# Patient Record
Sex: FEMALE | Race: BLACK OR AFRICAN AMERICAN | NOT HISPANIC OR LATINO | URBAN - METROPOLITAN AREA
[De-identification: names, ages, dates, MRNs, and addresses within clinical notes are randomized per-mention and may not be internally consistent; named-entity substitution may affect disease eponyms.]

---

## 2019-12-27 ENCOUNTER — EMERGENCY (EMERGENCY)
Facility: HOSPITAL | Age: 34
LOS: 1 days | Discharge: HOME | End: 2019-12-27
Admitting: EMERGENCY MEDICINE
Payer: COMMERCIAL

## 2019-12-27 ENCOUNTER — INBOUND DOCUMENT (OUTPATIENT)
Age: 34
End: 2019-12-27

## 2019-12-27 VITALS
HEART RATE: 95 BPM | OXYGEN SATURATION: 99 % | SYSTOLIC BLOOD PRESSURE: 148 MMHG | DIASTOLIC BLOOD PRESSURE: 91 MMHG | RESPIRATION RATE: 20 BRPM | TEMPERATURE: 98 F | HEIGHT: 70 IN | WEIGHT: 162.04 LBS

## 2019-12-27 VITALS
HEART RATE: 58 BPM | DIASTOLIC BLOOD PRESSURE: 76 MMHG | OXYGEN SATURATION: 100 % | TEMPERATURE: 98 F | RESPIRATION RATE: 18 BRPM | SYSTOLIC BLOOD PRESSURE: 117 MMHG

## 2019-12-27 PROBLEM — Z00.00 ENCOUNTER FOR PREVENTIVE HEALTH EXAMINATION: Status: ACTIVE | Noted: 2019-12-27

## 2019-12-27 PROCEDURE — 99283 EMERGENCY DEPT VISIT LOW MDM: CPT

## 2019-12-27 PROCEDURE — 73030 X-RAY EXAM OF SHOULDER: CPT

## 2019-12-27 PROCEDURE — 73030 X-RAY EXAM OF SHOULDER: CPT | Mod: 26,RT

## 2019-12-27 PROCEDURE — 73080 X-RAY EXAM OF ELBOW: CPT | Mod: 26,RT

## 2019-12-27 PROCEDURE — 73080 X-RAY EXAM OF ELBOW: CPT

## 2019-12-27 PROCEDURE — 99284 EMERGENCY DEPT VISIT MOD MDM: CPT

## 2019-12-27 RX ORDER — OXYCODONE AND ACETAMINOPHEN 5; 325 MG/1; MG/1
1 TABLET ORAL ONCE
Refills: 0 | Status: DISCONTINUED | OUTPATIENT
Start: 2019-12-27 | End: 2019-12-27

## 2019-12-27 RX ADMIN — OXYCODONE AND ACETAMINOPHEN 1 TABLET(S): 5; 325 TABLET ORAL at 13:10

## 2019-12-27 RX ADMIN — OXYCODONE AND ACETAMINOPHEN 1 TABLET(S): 5; 325 TABLET ORAL at 12:14

## 2019-12-27 RX ADMIN — OXYCODONE AND ACETAMINOPHEN 1 TABLET(S): 5; 325 TABLET ORAL at 12:54

## 2019-12-27 NOTE — ED PROVIDER NOTE - PHYSICAL EXAMINATION
VITAL SIGNS: I have reviewed nursing notes and confirm.  CONSTITUTIONAL: Well-developed; well-nourished; in no acute distress.   SKIN:  warm and dry, no acute rash.   HEAD:  normocephalic, atraumatic.  EYES: EOM intact; conjunctiva and sclera clear.  ENT: No nasal discharge; airway clear.   NECK: Supple; non tender. No C/T/L spine tenderness.   CARD: Regular rate and rhythm.   RESP:  Clear to auscultation b/l, no wheezes, rales or rhonchi.  ABD: Normal bowel sounds; soft; non-distended; non-tender; no guarding/ rebound.  EXT: Diffse tenderness to right shoulder and elbow with mild swelling. Limited ROM secondary to pain. Tenderness over right clavicle with no deformity. No ecchymosis and redness. Distal neurovascularly intact.   NEURO: Alert, oriented, grossly unremarkable  PSYCH: Cooperative, mood and affect appropriate. EXT: Diffse tenderness to right shoulder and elbow with mild swelling. Limited ROM secondary to pain. Tenderness over right distal clavicle with no deformity. No ecchymosis and no scapular tenderness. distal NVI. remainder of RUE non tender. no c/t/l spine tenderness.

## 2019-12-27 NOTE — ED ADULT NURSE NOTE - NSIMPLEMENTINTERV_GEN_ALL_ED
Implemented All Universal Safety Interventions:  [place] to call system. Call bell, personal items and telephone within reach. Instruct patient to call for assistance. Room bathroom lighting operational. Non-slip footwear when patient is off stretcher. Physically safe environment: no spills, clutter or unnecessary equipment. Stretcher in lowest position, wheels locked, appropriate side rails in place.
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
## 2019-12-27 NOTE — ED PROVIDER NOTE - CLINICAL SUMMARY MEDICAL DECISION MAKING FREE TEXT BOX
right shoulder pain. neurovascular intact. pt tearful on exam. pain meds given with improvement in pain. x-ray elbow and shoulder done with no acute pathology. ? frozen shoulder vs rotator cuff injury. pain meds, RICE and f/u with ortho. return precautions d/w pt.
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
## 2019-12-27 NOTE — ED PROVIDER NOTE - CARE PROVIDER_API CALL
Salty Raygoza)  Orthopaedic Surgery  [address] [place] [address]  Phone: [phone]  Fax: [phone]  Follow Up Time: 4-6 Days

## 2019-12-27 NOTE — ED PROVIDER NOTE - NSFOLLOWUPINSTRUCTIONS_ED_ALL_ED_FT
Follow up with orthopedics this week.         Adhesive Capsulitis    WHAT YOU NEED TO KNOW:    Adhesive capsulitis happens when tissues in your shoulder tighten and swell. The condition is often called frozen shoulder because the swollen tissues cause pain and decrease your shoulder movement.    DISCHARGE INSTRUCTIONS:    Return to the emergency department if:     You have new or increased trouble moving your arm.        Contact your healthcare provider if:     You have worse pain and stiffness in your shoulder.      You have questions or concerns about your condition.    Medicines:     Prescription pain medicine may be given. Ask your healthcare provider how to take this medicine safely. Some prescription pain medicines contain acetaminophen. Do not take other medicines that contain acetaminophen without talking to your healthcare provider. Too much acetaminophen may cause liver damage. Prescription pain medicine may cause constipation. Ask your healthcare provider how to prevent or treat constipation.       NSAIDs help decrease swelling and pain or fever. This medicine is available with or without a doctor's order. NSAIDs can cause stomach bleeding or kidney problems in certain people. If you take blood thinner medicine, always ask your healthcare provider if NSAIDs are safe for you. Always read the medicine label and follow directions.      Take your medicine as directed. Contact your healthcare provider if you think your medicine is not helping or if you have side effects. Tell him of her if you are allergic to any medicine. Keep a list of the medicines, vitamins, and herbs you take. Include the amounts, and when and why you take them. Bring the list or the pill bottles to follow-up visits. Carry your medicine list with you in case of an emergency.    Physical therapy: A physical therapist teaches you exercises to help improve movement and strength, and to decrease pain.     Stretches to do at home:     Doorway stretch:  a doorway with your painful arm bent at the elbow. Place your hand on the door frame and turn your body away from the door frame. Hold this position for 30 seconds. Relax and repeat.Shoulder Flexion Stretch           Forward stretch: Lie on your back with your legs straight out. Use your healthy arm to push your painful arm up over your head until you feel a gentle stretch. Hold this position for 15 seconds. Slowly lower your arm to the starting position. Relax and repeat.Arm Stretches Lying 1     Arm Stretches Lying 2           Crossover stretch: Use your healthy arm to gently pull your painful arm across your chest just below your chin. Pull until you feel a gentle stretch. Hold this position for 30 seconds. Relax and repeat.Crossover Arm Stretch          Apply ice as directed: Ice helps decrease pain and swelling. Apply ice to help ease pain after stretching. Use an ice pack, or put crushed ice in a plastic bag. Cover it with a towel before you apply it to your shoulder. Apply ice for 15 to 20 minutes every hour, or as directed.    Apply heat as directed: Heat helps relax muscles and may help improve shoulder movement. Use a heat pack, or soak a small towel in warm water. Wring out the extra water before you apply the towel to your shoulder. Apply heat for 20 to 30 minutes every hour, or as directed.    Follow up with your healthcare provider as directed: Write down your questions so you remember to ask them during your visits.       © Copyright [employer] 2019       back to top                      © Copyright [employer] 2019

## 2020-01-02 DIAGNOSIS — M25.411 EFFUSION, RIGHT SHOULDER: ICD-10-CM
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
## 2020-01-02 DIAGNOSIS — M25.511 PAIN IN RIGHT SHOULDER: ICD-10-CM

## 2020-01-02 DIAGNOSIS — R22.0 LOCALIZED SWELLING, MASS AND LUMP, HEAD: ICD-10-CM

## 2020-01-02 DIAGNOSIS — M25.521 PAIN IN RIGHT ELBOW: ICD-10-CM

## 2020-01-02 DIAGNOSIS — M25.421 EFFUSION, RIGHT ELBOW: ICD-10-CM

## 2020-01-02 DIAGNOSIS — R51 HEADACHE: ICD-10-CM

## 2021-05-18 NOTE — ED ADULT NURSE NOTE - HOW OFTEN DO YOU HAVE A DRINK CONTAINING ALCOHOL?
Use the albuterol every 4 hours as needed for wheezing + cough    Make sure to stay hydrated, push clear liquids and fluids, okay if he does not have much of an appetite yet.   The last thing to come back should be dairy, meats, anything fried or greasy Never

## 2022-09-12 NOTE — ED ADULT NURSE NOTE - CHIEF COMPLAINT
The patient is a 34y Female complaining of shoulder pain/injury. Yes - the patient is able to be screened

## 2025-01-16 NOTE — ED ADULT NURSE NOTE - NS ED NURSE RECORD ANOTHER VITAL SIGN
Render Note In Bullet Format When Appropriate: No Detail Level: Detailed Show Applicator Variable?: Yes Duration Of Freeze Thaw-Cycle (Seconds): 0 Post-Care Instructions: I reviewed with the patient in detail post-care instructions. Patient is to wear sunprotection, and avoid picking at any of the treated lesions. Pt may apply Vaseline to crusted or scabbing areas. Consent: The patient's consent was obtained including but not limited to risks of crusting, scabbing, blistering, scarring, darker or lighter pigmentary change, recurrence, incomplete removal and infection. Yes